# Patient Record
Sex: FEMALE | Race: BLACK OR AFRICAN AMERICAN | NOT HISPANIC OR LATINO | Employment: FULL TIME | URBAN - METROPOLITAN AREA
[De-identification: names, ages, dates, MRNs, and addresses within clinical notes are randomized per-mention and may not be internally consistent; named-entity substitution may affect disease eponyms.]

---

## 2022-11-27 ENCOUNTER — HOSPITAL ENCOUNTER (OUTPATIENT)
Dept: DATA CONVERSION | Facility: HOSPITAL | Age: 22
End: 2022-11-27
Attending: GENERAL ACUTE CARE HOSPITAL

## 2022-11-27 DIAGNOSIS — R51.9 HEADACHE, UNSPECIFIED: ICD-10-CM

## 2022-11-27 DIAGNOSIS — S01.91XA LACERATION WITHOUT FOREIGN BODY OF UNSPECIFIED PART OF HEAD, INITIAL ENCOUNTER: ICD-10-CM
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
## 2022-11-27 DIAGNOSIS — R10.9 UNSPECIFIED ABDOMINAL PAIN: ICD-10-CM
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
## 2022-11-27 DIAGNOSIS — S39.81XA OTHER SPECIFIED INJURIES OF ABDOMEN, INITIAL ENCOUNTER: ICD-10-CM

## 2022-11-27 DIAGNOSIS — T71.193A ASPHYXIATION DUE TO MECHANICAL THREAT TO BREATHING DUE TO OTHER CAUSES, ASSAULT, INITIAL ENCOUNTER: ICD-10-CM

## 2022-11-27 DIAGNOSIS — O26.892 OTHER SPECIFIED PREGNANCY RELATED CONDITIONS, SECOND TRIMESTER (HHS-HCC): ICD-10-CM

## 2022-11-27 DIAGNOSIS — O9A.312 PHYSICAL ABUSE COMPLICATING PREGNANCY, SECOND TRIMESTER (HHS-HCC): ICD-10-CM

## 2022-11-27 DIAGNOSIS — Z3A.21 21 WEEKS GESTATION OF PREGNANCY (HHS-HCC): ICD-10-CM

## 2022-11-27 DIAGNOSIS — O99.012 ANEMIA COMPLICATING PREGNANCY, SECOND TRIMESTER (HHS-HCC): ICD-10-CM

## 2022-11-27 DIAGNOSIS — D64.9 ANEMIA, UNSPECIFIED: ICD-10-CM
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
## 2023-03-22 ENCOUNTER — HOSPITAL ENCOUNTER (OUTPATIENT)
Dept: DATA CONVERSION | Facility: HOSPITAL | Age: 23
End: 2023-03-23
Attending: ADVANCED PRACTICE MIDWIFE

## 2023-03-22 DIAGNOSIS — O47.1 FALSE LABOR AT OR AFTER 37 COMPLETED WEEKS OF GESTATION (HHS-HCC): ICD-10-CM

## 2023-03-22 DIAGNOSIS — D64.9 ANEMIA, UNSPECIFIED: ICD-10-CM

## 2023-03-22 DIAGNOSIS — O09.213 SUPERVISION OF PREGNANCY WITH HISTORY OF PRE-TERM LABOR, THIRD TRIMESTER (HHS-HCC): ICD-10-CM

## 2023-03-22 DIAGNOSIS — Z3A.37 37 WEEKS GESTATION OF PREGNANCY (HHS-HCC): ICD-10-CM

## 2023-03-22 DIAGNOSIS — R10.9 UNSPECIFIED ABDOMINAL PAIN: ICD-10-CM

## 2023-03-22 DIAGNOSIS — R19.7 DIARRHEA, UNSPECIFIED: ICD-10-CM

## 2023-03-22 DIAGNOSIS — O99.013 ANEMIA COMPLICATING PREGNANCY, THIRD TRIMESTER (HHS-HCC): ICD-10-CM

## 2023-09-07 VITALS
SYSTOLIC BLOOD PRESSURE: 120 MMHG | OXYGEN SATURATION: 97 % | HEIGHT: 58 IN | DIASTOLIC BLOOD PRESSURE: 73 MMHG | BODY MASS INDEX: 32.39 KG/M2 | HEART RATE: 107 BPM | RESPIRATION RATE: 18 BRPM | WEIGHT: 154.32 LBS | TEMPERATURE: 98.2 F

## 2023-09-08 VITALS — BODY MASS INDEX: 33.74 KG/M2 | WEIGHT: 160.72 LBS | HEIGHT: 58 IN

## 2023-09-14 NOTE — PROGRESS NOTES
"    Current Stage:   Stage: Triage     OB Dating:   EDC/EGA:  ·  Final RUIZ 2023   ·  EGA 37.4     Subjective Data:   Antepartum:  Antepartum:    24yo  at 37.5 wga by 6wk OSH US presenting for r/o labor.    Reports ctx started at 1800 and feeling them \"back to back\", denies LOF, VB and endorsing good FM. Pt also reports frequent episodes of diarrhea today with cramping associated with episodes, GI symptoms improving. Last seen in the office by CNM at [facility]  and found to be 2.5cm dilated at the time.    Pregnancy c/b -   - PNC at [facility]  - h/o pLTCS @ 32 wks for IAI, desires TOLAC   - gonorrhea in pregnancy - s/p tx, neg MARYAM   - anemia - Hgb 10.3 in 2022, on PO iron       OBHx:     @38wga M 5'10\" no complications   pLTCS @ 32wga in the s/o IAI  GynHx: denies STIs, denies abnml paps  PMHx: denies  PSHx: CS as above  SHx: denies T/E/D  Meds: PNV  All: NKDA            Objective Information:    Objective Information:      T   P  R  BP   MAP  SpO2   Value  36.6  87  18  110/59   80  97%  Date/Time 3/22 21:35 3/22 21:35 3/22 21:35 3/22 21:35  3/22 21:35 3/22 21:35  Range  (36.6C - 36.6C )  (87 - 87 )  (18 - 18 )  (110 - 110 )/ (59 - 59 )  (80 - 80 )  (97% - 97% )      Physical Exam:   Constitutional: alert, oriented   Obstetric: FHT: cat 1, baseline 140s, mod variability,  +accels, -decels   Aneta: ctx  q3-5min  SVE: 3/50/-3 --> unchanged on recheck   Respiratory/Thorax: Normal respiratory effort on  room air   Cardiovascular: Warm and well perfused   Gastrointestinal: Abdomen soft, gravid, nontender   Musculoskeletal: Full ROM in all extremities   Extremities: No swelling, no edema, no calf tenderness   Psychological: Appropriate affect   Skin: no rashes or lesions      Testing:   NST Interpretation - Baby A:  ·  Baseline    ·  Variability moderate (amplitude range 6 to 25 bpm)   ·  Interpretation Reactive (2 15x15 accels)   ·  Accelerations present   ·  Decelerations absent "     Assessment and Plan:   Assessment:    24yo  at 37.5wga presenting for r/o labor.    Patient is experiencing contractions, without cervical change. She is not in labor.     False labor   - No cervical change after 2 hr recheck   - pt still feeling contractions but wanting to go home  - Precautions provided  - Recommended tylenol/warm compresses for discomfort, benadryl for sleep    Maternal wellbeing  - No acute distress  - Vitals stable and WNL    Fetal Well-Being  - FHT: cat 1  - Continue current prenatal care    Dispo  - Safe and stable for d/c to home  - Follow-up with OB provider as scheduled    Reviewed labor precautions with patient. Discussed need to return to labor and delivery if patient has strong, regular contractions, leakage of fluid, vaginal bleeding, or decreased fetal movement. Patient expresses understanding. Safe and stable for  discharge at this time.     d/w Dr. Daly Hatfield MD PGY-2     Updates made by Lidya Lopez MD, PGY1     Attestation:   Note Completion:  I am a:  Resident/Fellow   Attending Attestation I saw and evaluated the patient.  I personally obtained the key and critical portions of the history and physical exam or was physically present for key and  critical portions performed by the resident/fellow. I reviewed the resident/fellow?s documentation and discussed the patient with the resident/fellow.  I agree with the resident/fellow?s medical decision making as documented in the note.     I personally evaluated the patient on 22-Mar-2023         Electronic Signatures:  Andre Kauffman)  (Signed 23-Mar-2023 06:46)   Authored: Note Completion   Co-Signer: Subjective Data, Objective Data,  Testing, Assessment and Plan, Note Completion  Berhane Hatfield (MD (Resident))  (Signed 22-Mar-2023 22:43)   Authored: Current Stage, OB Dating, Subjective Data,  Objective Data, Assessment and Plan  Lidya Lopez (Resident))  (Signed 23-Mar-2023 00:37)   Authored:  Subjective Data, Objective Data,   Testing, Assessment and Plan, Note Completion      Last Updated: 23-Mar-2023 06:46 by Andre Kauffman)
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
## 2025-01-30 ENCOUNTER — HOSPITAL ENCOUNTER (EMERGENCY)
Facility: HOSPITAL | Age: 25
Discharge: HOME | End: 2025-01-31
Payer: COMMERCIAL

## 2025-01-30 DIAGNOSIS — J11.1 FLU: Primary | ICD-10-CM

## 2025-01-30 LAB
ALBUMIN SERPL BCP-MCNC: 4.4 G/DL (ref 3.4–5)
ALP SERPL-CCNC: 97 U/L (ref 33–110)
ALT SERPL W P-5'-P-CCNC: 26 U/L (ref 7–45)
ANION GAP SERPL CALC-SCNC: 15 MMOL/L (ref 10–20)
AST SERPL W P-5'-P-CCNC: 18 U/L (ref 9–39)
BASOPHILS # BLD AUTO: 0.07 X10*3/UL (ref 0–0.1)
BASOPHILS NFR BLD AUTO: 0.6 %
BILIRUB SERPL-MCNC: 0.3 MG/DL (ref 0–1.2)
BUN SERPL-MCNC: 12 MG/DL (ref 6–23)
CALCIUM SERPL-MCNC: 9.7 MG/DL (ref 8.6–10.6)
CHLORIDE SERPL-SCNC: 103 MMOL/L (ref 98–107)
CO2 SERPL-SCNC: 21 MMOL/L (ref 21–32)
CREAT SERPL-MCNC: 0.84 MG/DL (ref 0.5–1.05)
EGFRCR SERPLBLD CKD-EPI 2021: >90 ML/MIN/1.73M*2
EOSINOPHIL # BLD AUTO: 0.04 X10*3/UL (ref 0–0.7)
EOSINOPHIL NFR BLD AUTO: 0.3 %
ERYTHROCYTE [DISTWIDTH] IN BLOOD BY AUTOMATED COUNT: 13.2 % (ref 11.5–14.5)
FLUAV RNA RESP QL NAA+PROBE: DETECTED
FLUBV RNA RESP QL NAA+PROBE: NOT DETECTED
GLUCOSE SERPL-MCNC: 115 MG/DL (ref 74–99)
HCT VFR BLD AUTO: 38 % (ref 36–46)
HGB BLD-MCNC: 13.1 G/DL (ref 12–16)
IMM GRANULOCYTES # BLD AUTO: 0.13 X10*3/UL (ref 0–0.7)
IMM GRANULOCYTES NFR BLD AUTO: 1 % (ref 0–0.9)
LYMPHOCYTES # BLD AUTO: 1.55 X10*3/UL (ref 1.2–4.8)
LYMPHOCYTES NFR BLD AUTO: 12.3 %
MCH RBC QN AUTO: 28.7 PG (ref 26–34)
MCHC RBC AUTO-ENTMCNC: 34.5 G/DL (ref 32–36)
MCV RBC AUTO: 83 FL (ref 80–100)
MONOCYTES # BLD AUTO: 1.25 X10*3/UL (ref 0.1–1)
MONOCYTES NFR BLD AUTO: 9.9 %
NEUTROPHILS # BLD AUTO: 9.53 X10*3/UL (ref 1.2–7.7)
NEUTROPHILS NFR BLD AUTO: 75.9 %
NRBC BLD-RTO: 0 /100 WBCS (ref 0–0)
PLATELET # BLD AUTO: 363 X10*3/UL (ref 150–450)
POTASSIUM SERPL-SCNC: 3.7 MMOL/L (ref 3.5–5.3)
PROT SERPL-MCNC: 8.4 G/DL (ref 6.4–8.2)
RBC # BLD AUTO: 4.56 X10*6/UL (ref 4–5.2)
SARS-COV-2 RNA RESP QL NAA+PROBE: NOT DETECTED
SODIUM SERPL-SCNC: 135 MMOL/L (ref 136–145)
WBC # BLD AUTO: 12.6 X10*3/UL (ref 4.4–11.3)

## 2025-01-30 PROCEDURE — 36415 COLL VENOUS BLD VENIPUNCTURE: CPT | Performed by: EMERGENCY MEDICINE

## 2025-01-30 PROCEDURE — 99284 EMERGENCY DEPT VISIT MOD MDM: CPT

## 2025-01-30 PROCEDURE — 80053 COMPREHEN METABOLIC PANEL: CPT | Performed by: EMERGENCY MEDICINE

## 2025-01-30 PROCEDURE — 2500000004 HC RX 250 GENERAL PHARMACY W/ HCPCS (ALT 636 FOR OP/ED): Mod: SE | Performed by: STUDENT IN AN ORGANIZED HEALTH CARE EDUCATION/TRAINING PROGRAM

## 2025-01-30 PROCEDURE — 85025 COMPLETE CBC W/AUTO DIFF WBC: CPT | Performed by: EMERGENCY MEDICINE

## 2025-01-30 PROCEDURE — 87636 SARSCOV2 & INF A&B AMP PRB: CPT | Performed by: EMERGENCY MEDICINE

## 2025-01-30 RX ADMIN — SODIUM CHLORIDE, SODIUM LACTATE, POTASSIUM CHLORIDE, AND CALCIUM CHLORIDE 1000 ML: 600; 310; 30; 20 INJECTION, SOLUTION INTRAVENOUS at 22:12

## 2025-01-30 ASSESSMENT — COLUMBIA-SUICIDE SEVERITY RATING SCALE - C-SSRS
6. HAVE YOU EVER DONE ANYTHING, STARTED TO DO ANYTHING, OR PREPARED TO DO ANYTHING TO END YOUR LIFE?: NO
2. HAVE YOU ACTUALLY HAD ANY THOUGHTS OF KILLING YOURSELF?: NO
1. IN THE PAST MONTH, HAVE YOU WISHED YOU WERE DEAD OR WISHED YOU COULD GO TO SLEEP AND NOT WAKE UP?: NO

## 2025-01-30 ASSESSMENT — LIFESTYLE VARIABLES
EVER HAD A DRINK FIRST THING IN THE MORNING TO STEADY YOUR NERVES TO GET RID OF A HANGOVER: NO
HAVE PEOPLE ANNOYED YOU BY CRITICIZING YOUR DRINKING: NO
TOTAL SCORE: 0
EVER FELT BAD OR GUILTY ABOUT YOUR DRINKING: NO
HAVE YOU EVER FELT YOU SHOULD CUT DOWN ON YOUR DRINKING: NO

## 2025-01-30 ASSESSMENT — PAIN SCALES - GENERAL: PAINLEVEL_OUTOF10: 0 - NO PAIN

## 2025-01-30 ASSESSMENT — PAIN - FUNCTIONAL ASSESSMENT: PAIN_FUNCTIONAL_ASSESSMENT: 0-10

## 2025-01-31 VITALS
BODY MASS INDEX: 33.59 KG/M2 | RESPIRATION RATE: 20 BRPM | TEMPERATURE: 99.5 F | DIASTOLIC BLOOD PRESSURE: 80 MMHG | HEIGHT: 58 IN | OXYGEN SATURATION: 97 % | HEART RATE: 120 BPM | SYSTOLIC BLOOD PRESSURE: 122 MMHG

## 2025-01-31 PROCEDURE — 2500000001 HC RX 250 WO HCPCS SELF ADMINISTERED DRUGS (ALT 637 FOR MEDICARE OP): Mod: SE

## 2025-01-31 PROCEDURE — 96360 HYDRATION IV INFUSION INIT: CPT

## 2025-01-31 RX ORDER — OSELTAMIVIR PHOSPHATE 75 MG/1
75 CAPSULE ORAL EVERY 12 HOURS
Qty: 10 CAPSULE | Refills: 0 | Status: SHIPPED | OUTPATIENT
Start: 2025-01-31 | End: 2025-02-05

## 2025-01-31 RX ORDER — IBUPROFEN 600 MG/1
600 TABLET ORAL ONCE
Status: COMPLETED | OUTPATIENT
Start: 2025-01-31 | End: 2025-01-31

## 2025-01-31 RX ORDER — ACETAMINOPHEN 325 MG/1
650 TABLET ORAL EVERY 6 HOURS PRN
Qty: 20 TABLET | Refills: 0 | Status: SHIPPED | OUTPATIENT
Start: 2025-01-31 | End: 2025-02-05

## 2025-01-31 RX ORDER — ACETAMINOPHEN 325 MG/1
975 TABLET ORAL ONCE
Status: COMPLETED | OUTPATIENT
Start: 2025-01-31 | End: 2025-01-31

## 2025-01-31 RX ORDER — IBUPROFEN 600 MG/1
600 TABLET ORAL EVERY 6 HOURS PRN
Qty: 16 TABLET | Refills: 0 | Status: SHIPPED | OUTPATIENT
Start: 2025-01-31 | End: 2025-02-04

## 2025-01-31 RX ADMIN — IBUPROFEN 600 MG: 600 TABLET, FILM COATED ORAL at 00:25

## 2025-01-31 RX ADMIN — ACETAMINOPHEN 975 MG: 325 TABLET ORAL at 00:25

## 2025-01-31 NOTE — ED PROVIDER NOTES
HPI   Chief Complaint   Patient presents with    Flu Symptoms       24-year-old female presents for flulike symptoms including cough, congestion, chills, myalgia.  Started today.  Denies chest pain or dyspnea.  No nausea or vomiting.  No rashes or swelling.  No hemoptysis.  She has a scratchy throat as well.  She was exposed to her kids with similar symptoms.  Denies any other complaints.              Patient History   Past Medical History:   Diagnosis Date    Other specified health status     No pertinent past medical history    Supervision of pregnancy with insufficient  care, unspecified trimester 10/19/2017    Late prenatal care     History reviewed. No pertinent surgical history.  No family history on file.  Social History     Tobacco Use    Smoking status: Unknown    Smokeless tobacco: Not on file   Substance Use Topics    Alcohol use: Not on file    Drug use: Not on file       Physical Exam   ED Triage Vitals [25]   Temperature Heart Rate Respirations BP   (!) 39.4 °C (103 °F) (!) 146 (!) 21 110/78      Pulse Ox Temp Source Heart Rate Source Patient Position   97 % Oral -- --      BP Location FiO2 (%)     -- --       Physical Exam  Vitals and nursing note reviewed.   Constitutional:       General: She is not in acute distress.     Appearance: She is well-developed.   HENT:      Head: Normocephalic and atraumatic.   Eyes:      Conjunctiva/sclera: Conjunctivae normal.   Cardiovascular:      Rate and Rhythm: Normal rate and regular rhythm.      Heart sounds: No murmur heard.  Pulmonary:      Effort: Pulmonary effort is normal. No respiratory distress.      Breath sounds: Normal breath sounds.   Abdominal:      Palpations: Abdomen is soft.      Tenderness: There is no abdominal tenderness.   Musculoskeletal:         General: No swelling.      Cervical back: Neck supple.   Skin:     General: Skin is warm and dry.      Capillary Refill: Capillary refill takes less than 2 seconds.   Neurological:       Mental Status: She is alert.   Psychiatric:         Mood and Affect: Mood normal.           ED Course & MDM   Diagnoses as of 01/31/25 0034   Flu                 No data recorded     [place] Coma Scale Score: 15 (01/30/25 2131 : Wendi Fox RN)                           Medical Decision Making  Vital signs reviewed, significant for initial vitals with temp of 39.4, pulse 146, and respirations of 21.  Couple hours later at 2300 temp was 38.1, heart rate 127, respirations 21.  On reevaluation after the patient was able to come back from the lobby to be seen by me, temp is now 37.5, heart rate improved to 120, respirations 20.  Pressure now 122/80 and pulse ox remains 97% on room air.  She is overall well-appearing and in no apparent distress.  Speaks full sentences without difficulty.  Caring for her children well while she is here.  Diagnostic testing was performed while patient was waiting.  Flu a detected on PCR test.  The rest of the labs are unremarkable.  We are able to give her Tylenol and Motrin.  Patient had already had LR before she came back to see provider.  I did offer her additional fluids via IV but the patient declined, stating that she had been here too long with her kids and needed to get them home to sleep.  Stated that she feels improved and feels comfortable leaving knowing that she has the flu.  Advised to take some time off work and to follow-up with primary care and to return with any new or worsening symptoms.  Advised take Tylenol Motrin as needed for fevers, chills, myalgia, and to hydrate well and get plenty of sleep.  Advised to return to the ED with any new or worsening symptoms.  Patient in agreement with this plan.  Discharged in stable condition.  She was able to drink a couple of glasses of water first.  Given Tamiflu, Tylenol, Motrin.        Procedure  Procedures     Nilesh Martinez, CRYSTAL-CNP  01/31/25 0039
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
